# Patient Record
Sex: MALE | Race: WHITE
[De-identification: names, ages, dates, MRNs, and addresses within clinical notes are randomized per-mention and may not be internally consistent; named-entity substitution may affect disease eponyms.]

---

## 2018-07-18 ENCOUNTER — HOSPITAL ENCOUNTER (OUTPATIENT)
Dept: HOSPITAL 88 - OR | Age: 38
Discharge: HOME | End: 2018-07-18
Attending: SPECIALIST
Payer: COMMERCIAL

## 2018-07-18 DIAGNOSIS — Z47.2: Primary | ICD-10-CM

## 2018-07-18 DIAGNOSIS — I10: ICD-10-CM

## 2018-07-18 DIAGNOSIS — G47.33: ICD-10-CM

## 2018-07-18 PROCEDURE — 76001: CPT

## 2018-07-18 PROCEDURE — 20680 REMOVAL OF IMPLANT DEEP: CPT

## 2018-07-18 PROCEDURE — 93005 ELECTROCARDIOGRAM TRACING: CPT
